# Patient Record
Sex: FEMALE | Race: WHITE | URBAN - METROPOLITAN AREA
[De-identification: names, ages, dates, MRNs, and addresses within clinical notes are randomized per-mention and may not be internally consistent; named-entity substitution may affect disease eponyms.]

---

## 2024-10-14 ENCOUNTER — APPOINTMENT (RX ONLY)
Dept: URBAN - METROPOLITAN AREA CLINIC 28 | Facility: CLINIC | Age: 59
Setting detail: DERMATOLOGY
End: 2024-10-14

## 2024-10-14 DIAGNOSIS — L82.1 OTHER SEBORRHEIC KERATOSIS: ICD-10-CM

## 2024-10-14 DIAGNOSIS — L98.9 DISORDER OF THE SKIN AND SUBCUTANEOUS TISSUE, UNSPECIFIED: ICD-10-CM

## 2024-10-14 DIAGNOSIS — L21.8 OTHER SEBORRHEIC DERMATITIS: ICD-10-CM

## 2024-10-14 DIAGNOSIS — Z80.8 FAMILY HISTORY OF MALIGNANT NEOPLASM OF OTHER ORGANS OR SYSTEMS: ICD-10-CM

## 2024-10-14 DIAGNOSIS — D22 MELANOCYTIC NEVI: ICD-10-CM

## 2024-10-14 DIAGNOSIS — Z12.83 ENCOUNTER FOR SCREENING FOR MALIGNANT NEOPLASM OF SKIN: ICD-10-CM

## 2024-10-14 DIAGNOSIS — L20.89 OTHER ATOPIC DERMATITIS: ICD-10-CM | Status: INADEQUATELY CONTROLLED

## 2024-10-14 PROBLEM — L30.9 DERMATITIS, UNSPECIFIED: Status: ACTIVE | Noted: 2024-10-14

## 2024-10-14 PROBLEM — D22.9 MELANOCYTIC NEVI, UNSPECIFIED: Status: ACTIVE | Noted: 2024-10-14

## 2024-10-14 PROCEDURE — 99204 OFFICE O/P NEW MOD 45 MIN: CPT

## 2024-10-14 PROCEDURE — ? ADDITIONAL NOTES

## 2024-10-14 PROCEDURE — ? COUNSELING

## 2024-10-14 PROCEDURE — ? PRESCRIPTION

## 2024-10-14 RX ORDER — RUXOLITINIB 15 MG/G
CREAM TOPICAL
Qty: 60 | Refills: 2 | Status: ERX | COMMUNITY
Start: 2024-10-14

## 2024-10-14 RX ADMIN — RUXOLITINIB: 15 CREAM TOPICAL at 00:00

## 2024-10-14 ASSESSMENT — LOCATION SIMPLE DESCRIPTION DERM
LOCATION SIMPLE: LEFT EAR
LOCATION SIMPLE: LEFT UPPER BACK
LOCATION SIMPLE: POSTERIOR SCALP
LOCATION SIMPLE: RIGHT UPPER BACK
LOCATION SIMPLE: LEFT SCALP
LOCATION SIMPLE: ABDOMEN
LOCATION SIMPLE: LEFT JAWLINE

## 2024-10-14 ASSESSMENT — LOCATION DETAILED DESCRIPTION DERM
LOCATION DETAILED: LEFT ANTERIOR EARLOBE
LOCATION DETAILED: RIGHT OCCIPITAL SCALP
LOCATION DETAILED: LEFT INFERIOR UPPER BACK
LOCATION DETAILED: LEFT LATERAL ABDOMEN
LOCATION DETAILED: PERIUMBILICAL SKIN
LOCATION DETAILED: LEFT OCCIPITAL SCALP
LOCATION DETAILED: LEFT EXTERNAL AUDITORY CANAL
LOCATION DETAILED: LEFT MEDIAL FRONTAL SCALP
LOCATION DETAILED: LEFT TRAGUS
LOCATION DETAILED: RIGHT INFERIOR UPPER BACK
LOCATION DETAILED: LEFT POSTERIOR JAWLINE
LOCATION DETAILED: LEFT ANTITRAGUS

## 2024-10-14 ASSESSMENT — LOCATION ZONE DERM
LOCATION ZONE: EAR
LOCATION ZONE: FACE
LOCATION ZONE: SCALP
LOCATION ZONE: TRUNK
LOCATION ZONE: EXTERNAL_AUDITORY_CANAL

## 2024-10-14 NOTE — PROCEDURE: COUNSELING
Detail Level: Generalized
Skin Checks Recommendations: SSE q month\\nFBSE with derm q year
Detail Level: Zone
Moisturizer Recommendations: bland emollient q day may help, like Cetaphil cream
Detail Level: Detailed
Patient Specific Counseling (Will Not Stick From Patient To Patient): -\\n\\nPatient uses OTC cortisone in the area of ears, which initially helped but doesn't help as much anymore. She does have triamcinalone cream her PCP switched her to, and that helps some, but she uses it very rarely and her PCP advised her not to use it in the EAC where part of the rash persists and is itchy. Discussed that could try a non steroid to use. Will write for Opzelura cream to apply to affected areas of the ears BID PRn. Also probably some Seborrheic dermatitis. Recommended OTC dandruff shampoos for that. She does get flaking of the scalp, too and has found a tea tree shampoo that works best for her. \\n\\nWhile she is waiting for the Opzelura, could try to use the triamcinalone ointment as needed on affected areas BID for a week or two, to try to really get the rash under control, with the plan to taper it to q day then off and PRN. Dissussed risks and side effects with triamcinolone/steroids vs with Anders-inhibitors. DO report any treatment resistant rash or signs/symptoms of infection (discussed signs/sx).
Cleanser Recommendations: gentle, like Dove 
Patient Specific Counseling (Will Not Stick From Patient To Patient): --\\n\\notc dandruff shampoos recommended, but the patient has tried those and a tea tree oil shampoo she has found works best for her
Detail Level: Simple

## 2024-10-14 NOTE — HPI: OTHER
Condition:: Full body exam
Please Describe Your Condition:: is a new patient who is being seen for a chief complaint of Full body exam. Patient does not have a history of skin cancer. \\n\\nFamily history of melanoma sister on her leg, and that is very recent.\\n\\nShe has had a FBSE done once a few years ago.
Condition:: Eczema
Please Describe Your Condition:: is a new patient who is being seen for a chief complaint of Eczema. Patient reports that she has eczema behind her ears and in her ears that has been going on for the last couple of years. \\nHer GP said it eczema and recommended hydrocortisone cream. She said the hydrocortisone cream initially would clear the rash she gets behind the ear and on the earlobe, but it quit working so well.  So then her GP gave her triamcinolone cream to use on the outside of the ears but told her not to apply it to the inside, not to the external auditory canal area, but there is scaly rash in there, too and it itches sometimes. She has not used either the hydrocortisone or the triamcinolone regularly. She just uses one occasionally.
Condition:: One rash spot
Please Describe Your Condition:: is a new patient who is being seen for a chief complaint of One rash spot . Patient reports one  spot of a circular small rash spot on the right lower abdomen, she said that these spots will pop up every now and then on the lower abdomen. Her GP recommended lotrimin cream and it clears up. She has just one small resolving one now.
Condition:: seborrheic dermatitis
Please Describe Your Condition:: The patient finds she does get some flaking in her scalp at times. That has been a long time problem. She has found a tea tree oil shampoo that helps her the most.

## 2024-10-14 NOTE — PROCEDURE: ADDITIONAL NOTES
Render Risk Assessment In Note?: no
Additional Notes: Patient has already treated this spot with anti-fungal medication and feels like it is resolving. Reviewed that right now does not look fungus, but not sure what it initially looked like. No signs of fungus elsewhere, so tinea seems unlikely. Could have been a patch of eczema, small spot of psoriasis possible. Watch and return to clinic with changes or concerns or with new such spots. 
Detail Level: Simple